# Patient Record
(demographics unavailable — no encounter records)

---

## 2024-12-03 NOTE — CONSULT LETTER
[Dear  ___] : Dear  [unfilled], [Consult Letter:] : I had the pleasure of evaluating your patient, [unfilled]. [Please see my note below.] : Please see my note below. [Consult Closing:] : Thank you very much for allowing me to participate in the care of this patient.  If you have any questions, please do not hesitate to contact me. [Sincerely,] : Sincerely, [FreeTextEntry3] : Nancy Childers D.O.  for Pediatric Endocrinology Fellowship Residency Clerkship Director for Division  of Pediatric Endocrinology [facility]

## 2024-12-05 NOTE — HISTORY OF PRESENT ILLNESS
[Premenarchal] : premenarchal [FreeTextEntry2] : Pt is a 10 yr old female who presents today as referred by PMD due to concern for hyperlipidemia.  Blood work done in July 2024 showed a slightly elevated triglyceride level of 177 mg/dL and an LDL cholesterol of 120 mg/dL.  The remainder of the lipid profile was normal.  Mom states that Yuli eats a lot of sweets.  It typical diet is as follows:  Breakfast- milk and cereal, chocolate milk Lunch-mozzarella sticks, pizza, taco, quesadillas- water Dinner-rice and beans, pasta Loves sweets

## 2024-12-05 NOTE — CONSULT LETTER
[FreeTextEntry3] : Nancy Childers D.O.  for Pediatric Endocrinology Fellowship Residency Clerkship Director for Division  of Pediatric Endocrinology [facility]

## 2024-12-05 NOTE — PAST MEDICAL HISTORY
[At ___ Weeks Gestation] : at [unfilled] weeks gestation [Normal Vaginal Route] : by normal vaginal route [FreeTextEntry1] : 7 lbs [FreeTextEntry4] : GDm- took insulin during pregnancy

## 2024-12-05 NOTE — ASSESSMENT
[FreeTextEntry1] : Patient is a 10-year-old female who presents today as referred by the pediatrician due to concern of hyperlipidemia.  I discussed an LDL cholesterol below 130 mg/dL is quite normal and this is not a concern for me at this time.  We discussed that triglycerides are slightly elevated and normal for her age is below 150 mg/dL.  Triglycerides are generally elevated in individuals who have a diet high in processed foods, carbohydrates, and simple sugars.  It is additionally elevated with obesity.  For this reason I am recommending that she meet with our nutritionist to make lifestyle changes to help address these issues.  We will repeat the lipid panel at this time as a baseline and should the values be similar or have gone down, then follow-up with my nutritionist is all that will be needed.

## 2024-12-05 NOTE — PHYSICAL EXAM
[1] : was Tino stage 1 [Normal Appearance] : normal in appearance [Tino Stage ___] : the Tino stage for breast development was [unfilled] [Obese] : obese [Murmur] : no murmurs [FreeTextEntry1] : adipomastia

## 2024-12-05 NOTE — FAMILY HISTORY
[___ inches] : [unfilled] inches [de-identified] : htn [FreeTextEntry1] : healthy [FreeTextEntry2] : 20 yr old brother with seizures